# Patient Record
Sex: FEMALE | Race: WHITE | NOT HISPANIC OR LATINO | URBAN - METROPOLITAN AREA
[De-identification: names, ages, dates, MRNs, and addresses within clinical notes are randomized per-mention and may not be internally consistent; named-entity substitution may affect disease eponyms.]

---

## 2024-09-17 ENCOUNTER — HOSPITAL ENCOUNTER (EMERGENCY)
Facility: OTHER | Age: 34
Discharge: HOME | End: 2024-09-17
Attending: OBSTETRICS & GYNECOLOGY
Payer: COMMERCIAL

## 2024-09-17 VITALS
DIASTOLIC BLOOD PRESSURE: 69 MMHG | RESPIRATION RATE: 20 BRPM | HEIGHT: 65 IN | WEIGHT: 153 LBS | SYSTOLIC BLOOD PRESSURE: 134 MMHG | OXYGEN SATURATION: 99 % | BODY MASS INDEX: 25.49 KG/M2 | HEART RATE: 120 BPM | TEMPERATURE: 98 F

## 2024-09-17 DIAGNOSIS — Z3A.19 19 WEEKS GESTATION OF PREGNANCY: ICD-10-CM

## 2024-09-17 DIAGNOSIS — N93.9 VAGINAL BLEEDING: Primary | ICD-10-CM

## 2024-09-17 PROCEDURE — 99284 EMERGENCY DEPT VISIT MOD MDM: CPT | Mod: ,,, | Performed by: OBSTETRICS & GYNECOLOGY

## 2024-09-17 PROCEDURE — 99284 EMERGENCY DEPT VISIT MOD MDM: CPT

## 2024-09-17 NOTE — ED PROVIDER NOTES
"Encounter Date: 9/17/2024       History     Chief Complaint   Patient presents with    Vaginal Bleeding     Pt. Complains of vaginal bleeding that started this morning. Pt. Is 19 weeks pregnant. Pt. Is alert and ABC's are intact.     Kristan Ramsey is a 33 y.o.  At 19 weeks presented to the OB ED today (09/17/2024) with CC of vaginal bleeding.    Patient reports she went to the bathroom approximately 1 hour ago and noticed some bright red blood in the toilet. The bleeding did not continue and she did not need to wear a pad. The bleeding has now stopped. Last week she had a similar episode but the blood was dark brown. She suspects this bleeding may have come from a hemorrhoid. She denies leakage of fluid, and contractions.     She notes good fetal movement which she describes as "flutters."          Review of patient's allergies indicates:  No Known Allergies  No past medical history on file.  No past surgical history on file.  No family history on file.     Review of Systems   Constitutional:  Negative for fever.   HENT:  Negative for sore throat.    Eyes:  Negative for visual disturbance.   Respiratory:  Negative for shortness of breath.    Cardiovascular:  Negative for chest pain.   Gastrointestinal:  Negative for nausea.   Genitourinary:  Positive for vaginal bleeding. Negative for dysuria.   Musculoskeletal:  Negative for back pain.   Skin:  Negative for rash.   Neurological:  Negative for dizziness and weakness.   Hematological:  Does not bruise/bleed easily.       Physical Exam     Initial Vitals [09/17/24 1134]   BP Pulse Resp Temp SpO2   (!) 157/85 (!) 127 20 98.2 °F (36.8 °C) 99 %      MAP       --         Physical Exam    Vitals reviewed.  Constitutional: She appears well-developed and well-nourished. She is not diaphoretic. No distress.   HENT:   Head: Normocephalic and atraumatic.   Eyes: EOM are normal.   Neck:   Normal range of motion.  Cardiovascular:  Normal rate.         "   Pulmonary/Chest: No respiratory distress.   Abdominal: Abdomen is soft. There is no abdominal tenderness.   Genitourinary:    Vagina normal.   Rectum:      No tenderness.      No vaginal erythema, tenderness or bleeding.   No erythema, tenderness or bleeding in the vagina.    No signs of injury in the vagina.      Genitourinary Comments: No bleeding, lesions or injury. Cervix visually closed.   One hemorrhoid noted. Non-bleeding.      Musculoskeletal:         General: Normal range of motion.      Cervical back: Normal range of motion.     Neurological: She is oriented to person, place, and time.   Skin: Skin is warm and dry.   Psychiatric: She has a normal mood and affect.         ED Course   Procedures  Labs Reviewed - No data to display       Imaging Results    None          Medications - No data to display  Medical Decision Making  Kristan Ramsey is a 33 y.o. at 19 weeks presented to the OB ED today (09/17/2024) with CC of vaginal bleeding.      Temp:  [98 °F (36.7 °C)-98.2 °F (36.8 °C)] 98 °F (36.7 °C)  Pulse:  [120-127] 120  Resp:  [20] 20  SpO2:  [99 %-100 %] 99 %  BP: (134-157)/(69-85) 134/69      Vaginal Bleeding   - 1 episode today of bright red bleeding today, not enough for a pad  - Bleeding now discontinued  - SSE: No blood, os visually closed. Scant physiologic discharge  - US: fetus with HR of 160, gross movements observed  - Patient reassured  - Patient stable for discharge at this time  - ED return precautions given  - She voiced understanding and is in agreement with the plan      Nery Gomez MD  OBGYN   PGY-1                  Attending Attestation:   Physician Attestation Statement for Resident:  As the supervising MD   Physician Attestation Statement: I have personally seen and examined this patient.   I agree with the above history.  -:   As the supervising MD I agree with the above PE.     As the supervising MD I agree with the above treatment, course, plan, and disposition.    -: FHTs positive, no contractions noted on sonogram.  No blood noted in vaginal vault.  VSS.   Agree with discharge to home.  Will f/u with OB in 1 week.  I was personally present during the critical portions of the procedure(s) performed by the resident and was immediately available in the ED to provide services and assistance as needed during the entire procedure.   I have reviewed the following: old records at this facility.                                       Clinical Impression:  Final diagnoses:  [N93.9] Vaginal bleeding (Primary)  [Z3A.19] 19 weeks gestation of pregnancy          ED Disposition Condition    Discharge Stable          ED Prescriptions    None       Follow-up Information    None          Nurys Marrero MD  09/17/24 1094

## 2024-09-17 NOTE — DISCHARGE INSTRUCTIONS
Please read your discharge information thoroughly.    Call clinic during regular business hours at [phone] or Labor & Delivery Unit after hours at [phone] for vaginal bleeding, leakage of fluids, contractions every 2-5 minutes for 2 hours, decreased fetal movements, blood pressure of 140/90, headache not relieved by Tylenol, blurry vision, or temperature of 100.4 or greater.     Keep next clinic appointment.